# Patient Record
(demographics unavailable — no encounter records)

---

## 2025-02-24 NOTE — HISTORY OF PRESENT ILLNESS
[FreeTextEntry1] : 3 years, intermittent vertigo with movement.  When vertigo is severe, she may seen flicking lights, lasted les than a minute.  Normally it lasted 3-4 mins.  She has had tremors for 7 years, it happened when she held on something. There is no tremor at rest. it usually with small objects. Both sides but worse on the left side. She is right-handed. No issues with typing, or writing. Fine movement is not affected. It is not related to caffeine.  She contributed dizziness to low blood glucose. She had to lay down on the floor. After couple mins, she felt better. On that day, she woke up earlier than usual at5am.  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 5 days a week for the past 2.5 years.  Recently had miscarriage at 8 weeks.  PMH: hashimoto thyroiditis 4.5 years. Synthroid 88mcg.  3 children. She normally go to bed at 11pm and wake up at 7.30am denies headache.   Interval history: 2/24/25 She has had tremor still with action. worsen with stress.  sleep is fine. tremor is not debilitating. no palpitations  At times, when she got up and stand or get up from the bed at night. she would feel lightheadedness then within 15 seconds, she felt better.  left hand numbness at night.

## 2025-02-24 NOTE — PHYSICAL EXAM
[General Appearance - Alert] : alert [General Appearance - In No Acute Distress] : in no acute distress [Oriented To Time, Place, And Person] : oriented to person, place, and time [Impaired Insight] : insight and judgment were intact [Affect] : the affect was normal [Person] : oriented to person [Place] : oriented to place [Time] : oriented to time [Fluency] : fluency intact [Comprehension] : comprehension intact [Cranial Nerves Optic (II)] : visual acuity intact bilaterally,  visual fields full to confrontation, pupils equal round and reactive to light [Cranial Nerves Oculomotor (III)] : extraocular motion intact [Cranial Nerves Trigeminal (V)] : facial sensation intact symmetrically [Cranial Nerves Facial (VII)] : face symmetrical [Cranial Nerves Vestibulocochlear (VIII)] : hearing was intact bilaterally [Cranial Nerves Glossopharyngeal (IX)] : tongue and palate midline [Cranial Nerves Accessory (XI - Cranial And Spinal)] : head turning and shoulder shrug symmetric [Cranial Nerves Hypoglossal (XII)] : there was no tongue deviation with protrusion [Motor Tone] : muscle tone was normal in all four extremities [Motor Strength] : muscle strength was normal in all four extremities [Involuntary Movements] : no involuntary movements were seen [No Muscle Atrophy] : normal bulk in all four extremities [Sensation Tactile Decrease] : light touch was intact [Sensation Pain / Temperature Decrease] : pain and temperature was intact [Abnormal Walk] : normal gait [Balance] : balance was intact [2+] : Ankle jerk left 2+ [Over the Past 2 Weeks, Have You Felt Down, Depressed, or Hopeless?] : 1.) Over the past 2 weeks, have you felt down, depressed, or hopeless? No [Over the Past 2 Weeks, Have You Felt Little Interest or Pleasure Doing Things?] : 2.) Over the past 2 weeks, have you felt little interest or pleasure doing things? No [Romberg's Sign] : Romberg's sign was negtive [Past-pointing] : there was no past-pointing [Tremor] : no tremor present [Dysdiadochokinesia Bilaterally] : not present [Coordination - Dysmetria Impaired Finger-to-Nose Bilateral] : not present [Coordination - Dysmetria Impaired Heel-to-Shin Bilateral] : not present [Plantar Reflex Right Only] : normal on the right [Plantar Reflex Left Only] : normal on the left [FreeTextEntry7] : numbness at left hand.

## 2025-02-24 NOTE — DISCUSSION/SUMMARY
[FreeTextEntry1] : Ms. Velasco is a 29 yo woman with PMH Hashimoto thyroiditis. she is here for left hand numbness She had a baby 6 weeks ago. will do EMG  will do MRI head for left arm numbness. refer to movement  she will talk to her PCP for EKG/echocardiogram. She does not want any medications now. Her tremors did not affect her operating procedure.

## 2025-03-10 NOTE — HISTORY OF PRESENT ILLNESS
[Complications:___] : no complications [Delivery Date: ___] : on [unfilled] [] : delivered by vaginal delivery [Male] : Delivery History: baby boy [Wt. ___] : weighing [unfilled] [Breastfeeding] : currently nursing [Back to Normal] : is back to normal in size [Normal] : the vagina was normal [Examination Of The Breasts] : breasts are normal [Doing Well] : is doing well [No Sign of Infection] : is showing no signs of infection [Excellent Pain Control] : has excellent pain control [None] : None [FreeTextEntry8] : HPI: 30 y/o F s/p  on 01/10/2025 presenting for postpartum visit. Liveborn male. She is breastfeeding. She is feeling well and is without complaints. Her mood is stable. [de-identified] : PP exam WNL  [de-identified] : 32 y/o F presenting for 6 week PP visit   -normal PP exam   -Patient cleared to resume intercourse and exercise   -Patient counseled on contraception options, unsure what she would like to use.    - depression screen done   -f/u for 3 months for annual